# Patient Record
Sex: FEMALE | Race: WHITE | NOT HISPANIC OR LATINO | URBAN - METROPOLITAN AREA
[De-identification: names, ages, dates, MRNs, and addresses within clinical notes are randomized per-mention and may not be internally consistent; named-entity substitution may affect disease eponyms.]

---
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
## 2024-04-12 ENCOUNTER — HOSPITAL ENCOUNTER (OUTPATIENT)
Dept: RADIOLOGY | Facility: CLINIC | Age: 19
Discharge: HOME | End: 2024-04-12
Payer: COMMERCIAL

## 2024-04-12 ENCOUNTER — OFFICE VISIT (OUTPATIENT)
Dept: OBSTETRICS AND GYNECOLOGY | Facility: CLINIC | Age: 19
End: 2024-04-12
Payer: COMMERCIAL

## 2024-04-12 VITALS
WEIGHT: 107 LBS | HEIGHT: 67 IN | BODY MASS INDEX: 16.79 KG/M2 | SYSTOLIC BLOOD PRESSURE: 110 MMHG | DIASTOLIC BLOOD PRESSURE: 68 MMHG

## 2024-04-12 DIAGNOSIS — Z30.011 ENCOUNTER FOR INITIAL PRESCRIPTION OF CONTRACEPTIVE PILLS: Primary | ICD-10-CM

## 2024-04-12 DIAGNOSIS — N92.6 IRREGULAR MENSES: ICD-10-CM

## 2024-04-12 LAB — POC HEMOGLOBIN: 12.2 G/DL (ref 12–16)

## 2024-04-12 PROCEDURE — 99204 OFFICE O/P NEW MOD 45 MIN: CPT | Performed by: OBSTETRICS & GYNECOLOGY

## 2024-04-12 PROCEDURE — 85018 HEMOGLOBIN: CPT | Performed by: OBSTETRICS & GYNECOLOGY

## 2024-04-12 PROCEDURE — 76856 US EXAM PELVIC COMPLETE: CPT | Performed by: OBSTETRICS & GYNECOLOGY

## 2024-04-12 RX ORDER — DROSPIRENONE AND ETHINYL ESTRADIOL 0.03MG-3MG
1 KIT ORAL DAILY
Qty: 84 TABLET | Refills: 3 | Status: SHIPPED | OUTPATIENT
Start: 2024-04-12 | End: 2025-04-12

## 2024-04-12 NOTE — PROGRESS NOTES
Patient here as a referral from her pediatrician for irregular.  Menarche age 13  Typically would have 2 periods a year  Pediatrician stated it was due to her physical activity and low body fat  She started oral contraceptive pills at the age of 15 which resulted in regular cycles until about 6 months ago  Now she is bleeding 2 weeks out of every month  She is compliant with her pill  She started Lexapro about 3 months ago  She is sexually active and had STI screening which was negative in December  Thyroid screening was also normal in December    Used spironolactone in the past but did not help with acne   oral contraceptive pill is not helping with acne    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  hemodynamically stable. Acne  - change to drospirinone 3-0.03mg  -  condoms for backup for the first pack  -  patient will notify me after 3 if still having issues packs

## 2024-07-31 ENCOUNTER — TELEPHONE (OUTPATIENT)
Dept: PEDIATRICS | Facility: CLINIC | Age: 19
End: 2024-07-31
Payer: COMMERCIAL

## 2024-07-31 DIAGNOSIS — Z00.00 WELLNESS EXAMINATION: Primary | ICD-10-CM

## 2024-07-31 NOTE — TELEPHONE ENCOUNTER
Has WCC with you next week, is asking if you can put labs in for her so she has the results for her visit?  Needs TB  and HBV titers

## 2024-08-06 ENCOUNTER — LAB (OUTPATIENT)
Dept: LAB | Facility: LAB | Age: 19
End: 2024-08-06
Payer: COMMERCIAL

## 2024-08-06 DIAGNOSIS — Z00.00 WELLNESS EXAMINATION: ICD-10-CM

## 2024-08-06 LAB — HBV SURFACE AB SER-ACNC: 105.6 MIU/ML

## 2024-08-06 PROCEDURE — 86481 TB AG RESPONSE T-CELL SUSP: CPT

## 2024-08-06 PROCEDURE — 36415 COLL VENOUS BLD VENIPUNCTURE: CPT

## 2024-08-06 PROCEDURE — 86706 HEP B SURFACE ANTIBODY: CPT

## 2024-08-07 ENCOUNTER — APPOINTMENT (OUTPATIENT)
Dept: PEDIATRICS | Facility: CLINIC | Age: 19
End: 2024-08-07
Payer: COMMERCIAL

## 2024-08-07 VITALS
SYSTOLIC BLOOD PRESSURE: 136 MMHG | WEIGHT: 105 LBS | HEART RATE: 130 BPM | HEIGHT: 67 IN | DIASTOLIC BLOOD PRESSURE: 89 MMHG | BODY MASS INDEX: 16.48 KG/M2

## 2024-08-07 DIAGNOSIS — F41.9 ANXIETY: ICD-10-CM

## 2024-08-07 DIAGNOSIS — Z00.00 WELLNESS EXAMINATION: Primary | ICD-10-CM

## 2024-08-07 PROCEDURE — 99395 PREV VISIT EST AGE 18-39: CPT | Performed by: NURSE PRACTITIONER

## 2024-08-07 PROCEDURE — 3008F BODY MASS INDEX DOCD: CPT | Performed by: NURSE PRACTITIONER

## 2024-08-07 RX ORDER — ESCITALOPRAM OXALATE 10 MG/1
10 TABLET ORAL DAILY
COMMUNITY
Start: 2024-05-11

## 2024-08-07 NOTE — PROGRESS NOTES
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 Her blood pressure is 136/89 and her pulse is 130 (abnormal).     General: Well-developed, well-nourished, alert and oriented, no acute distress  Eyes: Normal sclera, VIKY, EOMI. Red reflex intact, light reflex symmetric.   ENT: Moist mucous membranes, normal throat, no nasal discharge. TMs are normal.  Cardiac:  Normal S1/S2, regular rhythm. Capillary refill less than 2 seconds. No clinically significant murmurs.    Pulmonary: Clear to auscultation bilaterally, no work of breathing.  GI: Soft nontender nondistended abdomen, no HSM, no masses.    Skin: No specific or unusual rashes  Neuro: Symmetric face, no ataxia, grossly normal strength.  Lymph and Neck: No lymphadenopathy, no visible thyroid swelling.  Orthopedic:  normal range of motion of shoulders and normal duck walk, normal spine/no scoliosis  :  not examined, discussed self exams.      Problem List Items Addressed This Visit    None  Visit Diagnoses         Codes    Wellness examination    -  Primary Z00.00    Body mass index (BMI) less than 16.5     Z68.1    Anxiety     F41.9            Lelia is growing and developing well.  Make sure to continue wearing seat belts and helmets for riding bikes or scooters.       Now that your child has been old enough to drive and may have a license, continue to set a good example by wearing your seat belt and not using your phone while " "driving.   Teen drivers should keep their phones out of reach or in the trunk so they are not tempted to use them while driving. Parents should review online safety for their adolescent children including privacy and over-sharing.  Keep watch your your child's online interactions with concerns for bullying or inappropriate posts.     Screen time (including TV, computer, tablets, phones) should be limited to 2 hours a day to encourage activity and allow for \"in-person\" social development.    We discussed physical activity and nutritional requirements today. Continue to return annually for a checkup and any necessary booster vaccines.    We discussed taking Lexapro at night instead of the morning to see if this helps with the inability to sleep well.  We also discussed the need to increase calories in diet due to recent weight loss while training for a marathon.    "

## 2024-08-08 ENCOUNTER — TELEPHONE (OUTPATIENT)
Dept: PEDIATRICS | Facility: CLINIC | Age: 19
End: 2024-08-08
Payer: COMMERCIAL

## 2024-08-08 LAB
NIL(NEG) CONTROL SPOT COUNT: NORMAL
PANEL A SPOT COUNT: 0
PANEL B SPOT COUNT: 0
POS CONTROL SPOT COUNT: NORMAL
T-SPOT. TB INTERPRETATION: NEGATIVE

## 2024-08-08 NOTE — TELEPHONE ENCOUNTER
----- Message from Autumn Belle sent at 8/8/2024 11:54 AM EDT -----  Please let patient know that her TB test was negative.  Thanks

## 2025-03-11 DIAGNOSIS — N92.6 IRREGULAR MENSES: ICD-10-CM

## 2025-03-11 DIAGNOSIS — Z30.011 ENCOUNTER FOR INITIAL PRESCRIPTION OF CONTRACEPTIVE PILLS: ICD-10-CM

## 2025-03-11 RX ORDER — DROSPIRENONE AND ETHINYL ESTRADIOL 0.03MG-3MG
1 KIT ORAL DAILY
Qty: 84 TABLET | Refills: 0 | Status: SHIPPED | OUTPATIENT
Start: 2025-03-11

## 2025-03-17 ENCOUNTER — OFFICE VISIT (OUTPATIENT)
Dept: PEDIATRICS | Facility: CLINIC | Age: 20
End: 2025-03-17
Payer: COMMERCIAL

## 2025-03-17 VITALS — BODY MASS INDEX: 15.7 KG/M2 | WEIGHT: 100 LBS | HEIGHT: 67 IN

## 2025-03-17 DIAGNOSIS — R63.4 ABNORMAL WEIGHT LOSS: Primary | ICD-10-CM

## 2025-03-17 PROCEDURE — 3008F BODY MASS INDEX DOCD: CPT | Performed by: PEDIATRICS

## 2025-03-17 PROCEDURE — 99214 OFFICE O/P EST MOD 30 MIN: CPT | Performed by: PEDIATRICS

## 2025-03-17 NOTE — PROGRESS NOTES
"Subjective   Patient ID: Lelia Deal is a 19 y.o. female.    HPI  History obtained from patient. Here today with mom for weight loss concerns. She has unintentionally lost about 15 lb over the past two years. She came home from school for spring break and her mom noticed the weight loss. She is down 7lb in the past year. Her diet and activity level is unchanged. She has regular Bms and no blood in her stool. No significant family history. Does feel like she urinates more than anyone else but that isn't new. No recent illnesses.     Review of Systems  ROS otherwise negative.     Objective   Physical Exam  Visit Vitals  Ht 1.702 m (5' 7\")   Wt 45.4 kg (100 lb)   BMI 15.66 kg/m²   OB Status Having periods   Smoking Status Every Day   BSA 1.47 m²   alert and active; head atraumatic/normocephalic; kam; tms clear; mmm; no erythema or exudate; no rhinorrhea/congestion; neck supple with no lad; lungs clear; rrr; no murmur; abd soft/nt/nd; no rashes      Assessment/Plan   Diagnoses and all orders for this visit:  Abnormal weight loss  -     CBC and Auto Differential; Future  -     Comprehensive Metabolic Panel; Future  -     Sedimentation rate, automated; Future  -     TSH with reflex to Free T4 if abnormal; Future  -     Lipid panel; Future  -     Tissue Transglutaminase IgA; Future  -     IgA; Future  -     Vitamin D 25-Hydroxy,Total (for eval of Vitamin D levels); Future  -     Vitamin B12; Future  -     Folate; Future    Here today with mom for weight loss. Will do screening labs and call with results when complete. Will work on increasing protein intake, avoid skipping meals, etc.   "

## 2025-03-19 LAB
25(OH)D3+25(OH)D2 SERPL-MCNC: 20 NG/ML (ref 30–100)
ALBUMIN SERPL-MCNC: 4.9 G/DL (ref 3.6–5.1)
ALP SERPL-CCNC: 43 U/L (ref 31–125)
ALT SERPL-CCNC: 11 U/L (ref 6–29)
ANION GAP SERPL CALCULATED.4IONS-SCNC: 10 MMOL/L (CALC) (ref 7–17)
AST SERPL-CCNC: 14 U/L (ref 10–30)
BASOPHILS # BLD AUTO: 51 CELLS/UL (ref 0–200)
BASOPHILS NFR BLD AUTO: 0.9 %
BILIRUB SERPL-MCNC: 0.4 MG/DL (ref 0.2–1.2)
BUN SERPL-MCNC: 12 MG/DL (ref 7–25)
CALCIUM SERPL-MCNC: 9.7 MG/DL (ref 8.6–10.2)
CHLORIDE SERPL-SCNC: 107 MMOL/L (ref 98–110)
CHOLEST SERPL-MCNC: 159 MG/DL
CHOLEST/HDLC SERPL: 2.5 (CALC)
CO2 SERPL-SCNC: 24 MMOL/L (ref 20–32)
CREAT SERPL-MCNC: 0.98 MG/DL (ref 0.5–0.96)
EGFRCR SERPLBLD CKD-EPI 2021: 85 ML/MIN/1.73M2
EOSINOPHIL # BLD AUTO: 103 CELLS/UL (ref 15–500)
EOSINOPHIL NFR BLD AUTO: 1.8 %
ERYTHROCYTE [DISTWIDTH] IN BLOOD BY AUTOMATED COUNT: 11.8 % (ref 11–15)
ERYTHROCYTE [SEDIMENTATION RATE] IN BLOOD BY WESTERGREN METHOD: 2 MM/H
FOLATE SERPL-MCNC: 19.9 NG/ML
GLUCOSE SERPL-MCNC: 72 MG/DL (ref 65–99)
HCT VFR BLD AUTO: 42.2 % (ref 35–45)
HDLC SERPL-MCNC: 63 MG/DL
HGB BLD-MCNC: 13.9 G/DL (ref 11.7–15.5)
IGA SERPL-MCNC: 167 MG/DL (ref 47–310)
LDLC SERPL CALC-MCNC: 73 MG/DL (CALC)
LYMPHOCYTES # BLD AUTO: 2046 CELLS/UL (ref 850–3900)
LYMPHOCYTES NFR BLD AUTO: 35.9 %
MCH RBC QN AUTO: 30.2 PG (ref 27–33)
MCHC RBC AUTO-ENTMCNC: 32.9 G/DL (ref 32–36)
MCV RBC AUTO: 91.5 FL (ref 80–100)
MONOCYTES # BLD AUTO: 587 CELLS/UL (ref 200–950)
MONOCYTES NFR BLD AUTO: 10.3 %
NEUTROPHILS # BLD AUTO: 2913 CELLS/UL (ref 1500–7800)
NEUTROPHILS NFR BLD AUTO: 51.1 %
NONHDLC SERPL-MCNC: 96 MG/DL (CALC)
PLATELET # BLD AUTO: 386 THOUSAND/UL (ref 140–400)
PMV BLD REES-ECKER: 9.9 FL (ref 7.5–12.5)
POTASSIUM SERPL-SCNC: 4.7 MMOL/L (ref 3.5–5.3)
PROT SERPL-MCNC: 7.9 G/DL (ref 6.1–8.1)
RBC # BLD AUTO: 4.61 MILLION/UL (ref 3.8–5.1)
SODIUM SERPL-SCNC: 141 MMOL/L (ref 135–146)
TRIGL SERPL-MCNC: 150 MG/DL
TSH SERPL-ACNC: 0.78 MIU/L
TTG IGA SER-ACNC: <1 U/ML
VIT B12 SERPL-MCNC: 298 PG/ML (ref 200–1100)
WBC # BLD AUTO: 5.7 THOUSAND/UL (ref 3.8–10.8)

## 2025-06-01 DIAGNOSIS — N92.6 IRREGULAR MENSES: ICD-10-CM

## 2025-06-01 DIAGNOSIS — Z30.011 ENCOUNTER FOR INITIAL PRESCRIPTION OF CONTRACEPTIVE PILLS: ICD-10-CM

## 2025-06-02 RX ORDER — DROSPIRENONE AND ETHINYL ESTRADIOL 0.03MG-3MG
1 KIT ORAL DAILY
Qty: 84 TABLET | Refills: 0 | Status: SHIPPED | OUTPATIENT
Start: 2025-06-02

## 2025-08-28 DIAGNOSIS — Z30.011 ENCOUNTER FOR INITIAL PRESCRIPTION OF CONTRACEPTIVE PILLS: ICD-10-CM

## 2025-08-28 DIAGNOSIS — N92.6 IRREGULAR MENSES: ICD-10-CM

## 2025-08-28 RX ORDER — DROSPIRENONE AND ETHINYL ESTRADIOL 0.03MG-3MG
1 KIT ORAL
Qty: 84 TABLET | Refills: 0 | Status: SHIPPED | OUTPATIENT
Start: 2025-08-28